# Patient Record
Sex: MALE | Race: BLACK OR AFRICAN AMERICAN | NOT HISPANIC OR LATINO | Employment: UNEMPLOYED | URBAN - METROPOLITAN AREA
[De-identification: names, ages, dates, MRNs, and addresses within clinical notes are randomized per-mention and may not be internally consistent; named-entity substitution may affect disease eponyms.]

---

## 2020-06-22 DIAGNOSIS — M25.531 RIGHT WRIST PAIN: Primary | ICD-10-CM

## 2023-08-01 ENCOUNTER — TELEPHONE (OUTPATIENT)
Dept: NEUROLOGY | Facility: CLINIC | Age: 43
End: 2023-08-01
Payer: MEDICAID

## 2023-08-01 NOTE — TELEPHONE ENCOUNTER
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  The patient has medicaid as his insurance which I am aware we do not accept but was not sure in this case if an exception is made.  I have scanned the referral and notes into media mgr.  Please review and contact the patient to schedule or advise.     Thank you,    Jennie Deluca  [facility]

## 2023-08-18 ENCOUNTER — TELEPHONE (OUTPATIENT)
Dept: NEUROLOGY | Facility: CLINIC | Age: 43
End: 2023-08-18
Payer: MEDICAID

## 2023-08-18 NOTE — TELEPHONE ENCOUNTER
Received communication from [facility] regarding the pt needing to be scheduled. I secured an appt with Dr Saenz for 08/28/23 but was unable to reach the pt or the significant other. The numbers that are in the system were either disconnected or not accepting calls. I will keep this appt on the schedule in the event we are able to reach the pt in within the next week.  Reached out to the pt's PCP, spoke with Haley. She reports she has the same contact information that is out dated, but the pt is scheduled for a visit Aug 31st with that office. She states that the pt occassionally pops in to visit with them and if he comes in before his scheduled appt, she will inform him of his appt with Dr Saenz on Aug 28th. I provided my contact information and advised the pt to ask for me if/ when he calls.

## 2023-08-28 ENCOUNTER — OFFICE VISIT (OUTPATIENT)
Dept: NEUROLOGY | Facility: CLINIC | Age: 43
End: 2023-08-28
Payer: MEDICAID

## 2023-08-28 ENCOUNTER — TELEPHONE (OUTPATIENT)
Dept: NEUROLOGY | Facility: CLINIC | Age: 43
End: 2023-08-28
Payer: MEDICAID

## 2023-08-28 VITALS
SYSTOLIC BLOOD PRESSURE: 105 MMHG | RESPIRATION RATE: 17 BRPM | DIASTOLIC BLOOD PRESSURE: 71 MMHG | HEIGHT: 70 IN | BODY MASS INDEX: 21.6 KG/M2 | HEART RATE: 72 BPM | WEIGHT: 150.88 LBS

## 2023-08-28 DIAGNOSIS — M21.372 LEFT FOOT DROP: ICD-10-CM

## 2023-08-28 DIAGNOSIS — R53.1 WEAKNESS: Primary | ICD-10-CM

## 2023-08-28 DIAGNOSIS — F41.9 ANXIETY: ICD-10-CM

## 2023-08-28 PROCEDURE — 99999 PR PBB SHADOW E&M-EST. PATIENT-LVL IV: CPT | Mod: PBBFAC,,, | Performed by: PSYCHIATRY & NEUROLOGY

## 2023-08-28 PROCEDURE — 99214 OFFICE O/P EST MOD 30 MIN: CPT | Mod: PBBFAC,PO | Performed by: PSYCHIATRY & NEUROLOGY

## 2023-08-28 PROCEDURE — 1159F MED LIST DOCD IN RCRD: CPT | Mod: CPTII,,, | Performed by: PSYCHIATRY & NEUROLOGY

## 2023-08-28 PROCEDURE — 3078F PR MOST RECENT DIASTOLIC BLOOD PRESSURE < 80 MM HG: ICD-10-PCS | Mod: CPTII,,, | Performed by: PSYCHIATRY & NEUROLOGY

## 2023-08-28 PROCEDURE — 3078F DIAST BP <80 MM HG: CPT | Mod: CPTII,,, | Performed by: PSYCHIATRY & NEUROLOGY

## 2023-08-28 PROCEDURE — 1159F PR MEDICATION LIST DOCUMENTED IN MEDICAL RECORD: ICD-10-PCS | Mod: CPTII,,, | Performed by: PSYCHIATRY & NEUROLOGY

## 2023-08-28 PROCEDURE — 99204 OFFICE O/P NEW MOD 45 MIN: CPT | Mod: S$PBB,,, | Performed by: PSYCHIATRY & NEUROLOGY

## 2023-08-28 PROCEDURE — 99204 PR OFFICE/OUTPT VISIT, NEW, LEVL IV, 45-59 MIN: ICD-10-PCS | Mod: S$PBB,,, | Performed by: PSYCHIATRY & NEUROLOGY

## 2023-08-28 PROCEDURE — 3074F PR MOST RECENT SYSTOLIC BLOOD PRESSURE < 130 MM HG: ICD-10-PCS | Mod: CPTII,,, | Performed by: PSYCHIATRY & NEUROLOGY

## 2023-08-28 PROCEDURE — 3008F PR BODY MASS INDEX (BMI) DOCUMENTED: ICD-10-PCS | Mod: CPTII,,, | Performed by: PSYCHIATRY & NEUROLOGY

## 2023-08-28 PROCEDURE — 99999 PR PBB SHADOW E&M-EST. PATIENT-LVL IV: ICD-10-PCS | Mod: PBBFAC,,, | Performed by: PSYCHIATRY & NEUROLOGY

## 2023-08-28 PROCEDURE — 1160F RVW MEDS BY RX/DR IN RCRD: CPT | Mod: CPTII,,, | Performed by: PSYCHIATRY & NEUROLOGY

## 2023-08-28 PROCEDURE — 3008F BODY MASS INDEX DOCD: CPT | Mod: CPTII,,, | Performed by: PSYCHIATRY & NEUROLOGY

## 2023-08-28 PROCEDURE — 1160F PR REVIEW ALL MEDS BY PRESCRIBER/CLIN PHARMACIST DOCUMENTED: ICD-10-PCS | Mod: CPTII,,, | Performed by: PSYCHIATRY & NEUROLOGY

## 2023-08-28 PROCEDURE — 3074F SYST BP LT 130 MM HG: CPT | Mod: CPTII,,, | Performed by: PSYCHIATRY & NEUROLOGY

## 2023-08-28 RX ORDER — ASPIRIN 81 MG/1
TABLET ORAL
COMMUNITY
Start: 2023-03-20

## 2023-08-28 RX ORDER — SERTRALINE HYDROCHLORIDE 100 MG/1
100 TABLET, FILM COATED ORAL
COMMUNITY
Start: 2023-08-03

## 2023-08-28 RX ORDER — QUETIAPINE FUMARATE 50 MG/1
50 TABLET, FILM COATED ORAL
COMMUNITY
Start: 2023-08-03

## 2023-08-28 RX ORDER — OMEPRAZOLE 40 MG/1
40 CAPSULE, DELAYED RELEASE ORAL
COMMUNITY
Start: 2023-08-03

## 2023-08-28 RX ORDER — HYDROCODONE BITARTRATE AND ACETAMINOPHEN 10; 325 MG/1; MG/1
1 TABLET ORAL 3 TIMES DAILY
COMMUNITY
Start: 2023-08-03

## 2023-08-28 RX ORDER — CYPROHEPTADINE HYDROCHLORIDE 4 MG/1
4 TABLET ORAL 4 TIMES DAILY
COMMUNITY
Start: 2023-08-03

## 2023-08-28 RX ORDER — ESCITALOPRAM OXALATE 5 MG/1
5 TABLET ORAL DAILY
Qty: 30 TABLET | Refills: 11 | Status: SHIPPED | OUTPATIENT
Start: 2023-08-28 | End: 2024-08-27

## 2023-08-28 RX ORDER — GABAPENTIN 400 MG/1
400 CAPSULE ORAL 3 TIMES DAILY
COMMUNITY
Start: 2023-07-31

## 2023-08-28 NOTE — PROGRESS NOTES
NEUROLOGY  Outpatient CONSULT  [facility]  [address] [facility], [place] [address]  [phone] (office) / [phone] (fax)    Patient Name:  Rickey Trimble Jr.  :  1980  MR #:  [id number]  Acct #:  [id number]    Date of Neurology Consult: 2023  Name of Neurologist: Lisa Saenz D.O, ABPN, AOBNP, ABEM    Other Physicians:  Dada Delarosa MD (Primary Care Physician); Self, Aaareferral (Referring)      Chief Complaint: als r/o      History of Present Illness (HPI):  Rickey Trimble Jr. is a 43 y.o. male here for evaluation of numbness and weakness.    Patient states in  he had an IV in his left arm.  He felt like they hit a nerve when they put the IV in.  But since then he started to feel numbness and tingling sensations in the left arm and leg and now the right arm and leg.  The left hand started to shrink and change in appearance around .  He began having difficulty holding cups.  He has trouble feeding himself and trouble bathing.  He has trouble walking that started around 2016 as well.  He is falling every now an then.  Maybe once a week he will lose balance.  There is no difficulty speaking or swallowing.  He will drag his foot when walking.  When he walks he will hear his foot stomp or slap.      There was no preceding illness.  He has no other family with anything like this.    There is no trouble with shortness of breath.  He has no trouble laying flat on his back.    They live in [place], [place].    He tried a medrol dose magaly but this was no help.    Treatment to date:    Medrol dose pack    Review of Systems:   General: Weight gain: Yes, Weight Loss: No, Fatigue: No,   Fever: No, Chills: No, Night Sweats: No, Insomnia: No, Excessive sleeping: No   Respiratory:  Cough: No, Shortness of Breath: No,   Wheezing: No, Excessive Snoring: No, Coughing up blood: No  Endocrine: Heat Intolerance: No, Cold Intolerance: No,   Excessive Thirst: No, Excessive Hunger: No,    Eyes:  Blurred Vision: Yes, Double Vision: No,   Light Sensitivity: Yes, Eye pain: No  Musculoskeletal: Muscle Aches/Pain: Yes, Joint Pain/Swelling: Yes, Muscle Cramps: Yes, Muscle Weakness: Yes, Neck Pain: Yes, Back Pain: Yes   Neurological: Difficulty Walking/Falls: Yes, Headache Migraine: Yes, Dizziness/Vertigo: Yes, Fainting: No, Difficulty with Speech: No, Weakness: Yes, Tingling/Numbness: Yes, Tremors: No, Memory Problems: No, Seizures: No, Difficulty Swallowing: No, Altered Taste: No.  Cardiovascular: Chest Pain: No, Shortness of Breath: No,   Palpitations: No,  Gastrointestinal: Nausea/Vomiting: No, Constipation: Yes, Diarrhea: No, Bloody Stools: No   Psych/Cog:  Depression: No, Anxiety: Yes, Hallucinations: No, Problems Concentrating: No  : Frequent Urination: No, Incontinence: No, Urinary Infections: No, Blood of Urine: No,   ENT:Hearing Loss: No, Earache: No, Ringing in Ears: No,   Facial Pain: Yes, Chronic Congestion: No   Immune: Seasonal Allergies: Yes, Hives and/or Rashes: Yes  The remainder of the review of twelve body systems was reviewed and normal.    Past Medical, Surgical, Family & Social History:   Past Medical History:   Diagnosis Date    Stroke 2020     History reviewed. No pertinent surgical history.  No family history on file.  Alcohol use:  reports no history of alcohol use.   (Of note, 0.6 oz = 1 beer or 6 oz = 10 beers).  Tobacco use:  reports that he has never smoked. He has never used smokeless tobacco.  Street drug use:  has no history on file for drug use.  Allergies: Patient has no known allergies..    Home Medications:     Current Outpatient Medications:     cyproheptadine (PERIACTIN) 4 mg tablet, Take 4 mg by mouth 4 (four) times daily., Disp: , Rfl:     gabapentin (NEURONTIN) 400 MG capsule, Take 400 mg by mouth 3 (three) times daily., Disp: , Rfl:     omeprazole (PRILOSEC) 40 MG capsule, Take 40 mg by mouth., Disp: , Rfl:     QUEtiapine (SEROQUEL) 50 MG tablet, Take 50 mg by  "mouth., Disp: , Rfl:     sertraline (ZOLOFT) 100 MG tablet, Take 100 mg by mouth., Disp: , Rfl:     aspirin (ECOTRIN) 81 MG EC tablet, 1 tablet Orally Once a day for 30 day(s), Disp: , Rfl:     HYDROcodone-acetaminophen (NORCO)  mg per tablet, Take 1 tablet by mouth 3 (three) times daily., Disp: , Rfl:     Physical Examination:  /71 (BP Location: Right arm, Patient Position: Sitting, BP Method: Small (Automatic))   Pulse 72   Resp 17   Ht 5' 10" (1.778 m)   Wt 68.5 kg (150 lb 14.5 oz)   BMI 21.65 kg/m²     GENERAL:  General appearance: Well, non-toxic appearing.  No apparent distress.  Extremities: normal.    MENTAL STATUS:  Alertness, attention span & concentration: normal.  Language: normal.  Orientation to self, place & time:  normal.  Memory, recent & remote: normal.  Fund of knowledge: normal.    SPEECH:  Clear and fluent.  Follows complex commands.    CRANIAL NERVES:  Cranial Nerves II-XII were examined.  II - Visual fields: normal.  III, IV, VI: PERRL, EOMI, No ptosis, No nystagmus.  V - Facial sensation: normal.  VII - Face symmetry & mobility: normal.  VIII - Hearing: normal.  IX, X - Palate: mobile & midline.  XI - Shoulder shrug: normal.  XII - Tongue protrusion: normal.    GROSS MOTOR:  Gait & station: normal.  Tone: normal.  Abnormal movements: none.  Finger-nose & Heel-knee-shin: normal.  Rapid alternating movements & drift: normal.      MUSCLE STRENGTH:     Fascics Atrophy RIGHT    LEFT Atrophy Fascics     5 Neck Ext. 5       5 Neck Flex 5       5 Deltoids 4       5 Sh.Ext.Rot. 4+       5 Sh.Int.Rot. 5       5 Biceps 3       5 Triceps 4       4 Forearm.Pr. 4       4 Wrist Ext. 2       4 Wrist Flex 2       3 Finger Ext. 2       5 Finger Flex 4       5 FPL 4       2 Inteross. 2                         4 Iliopsoas 4       5 Hip Abduct 5       5 Hip Adduct 5       4+ Quads 4+       4 Hams 4       4 Dorsiflex 3       4 Plantar Flex 4       4 Ankle Jhony 3       4 Ankle Invert 3       4 Toe " Ext. 3       4 Toe Flex 4                         REFLEXES:    RIGHT Reflex   LEFT   2+ Biceps 2+   2+ Brachiorad. 2+   2+ Triceps 2+        2+ Patellar 2+   2+ Ankle 2+         Down PLANTAR Down     SENSORY:  Light touch: Normal throughout.  Sharp touch: Normal throughout.  Vibration: Normal throughout.      Diagnostic Data Reviewed:     Assessment and Plan:  Rickey Trimble Jr. Is a 43 y.o. man with complaints of progressive weakness over the past 8 years.  He has some trouble with anxieties and foot dorp.  He can have some numbness with this.  Etiology is unclear at his time, but he has been informed this could be related to ALS.    Information on patient AVS:  Will order PT and OT for your weakness and muscle contractures.    Will get a brace for your left ankle to help avoid falls.    Will start Lexapro 5mg.  Will likely transition from sertraline to this for anxiety.    Will get your previous records including EMGs and MRI.    Will get an updated EMG.      Important to note, also  has a past medical history of Stroke (2020).    Time Spent: I spent a total of 55 minutes on the day of the visit.This includes face to face time and non-face to face time preparing to see the patient (eg, review of tests), Obtaining and/or reviewing separately obtained history, Documenting clinical information in the electronic or other health record, Independently interpreting resultsand communicating results to the patient/family/caregiver, or Care coordination.         Lisa Saenz D.O, ABPN, AOBNP, ABEM    This note was created with voice recognition software.  Grammatical, syntax and spelling errors may be inevitable.

## 2023-08-28 NOTE — PATIENT INSTRUCTIONS
Will order PT and OT for your weakness and muscle contractures.    Will get a brace for your left ankle to help avoid falls.    Will start Lexapro 5mg.  Will likely transition from sertraline to this for anxiety.    Will get your previous records including EMGs and MRI.    Will get an updated EMG.

## 2023-10-02 ENCOUNTER — TELEPHONE (OUTPATIENT)
Dept: NEUROLOGY | Facility: CLINIC | Age: 43
End: 2023-10-02
Payer: MEDICAID

## 2023-10-02 NOTE — TELEPHONE ENCOUNTER
Spoke with pt, offered sooner appt for EMG, pt can only do Mondays. I advised I will call back when I have a Monday cancellation. Pt v/u

## 2023-10-02 NOTE — TELEPHONE ENCOUNTER
----- Message from Yen Núñez sent at 10/2/2023  9:27 AM CDT -----  Type:  Patient Returning Call    Who Called:  pt  Who Left Message for Patient:  Rachna  Does the patient know what this is regarding?:  yes, reschedule EMG  Best Call Back Number:  [phone]  Additional Information:  Please call back to advise Thanks!

## 2024-07-09 ENCOUNTER — TELEPHONE (OUTPATIENT)
Dept: NEUROLOGY | Facility: CLINIC | Age: 44
End: 2024-07-09
Payer: MEDICAID

## 2024-07-09 NOTE — TELEPHONE ENCOUNTER
Left message for the pt to call in regards to a message we received from the pharmacy stating that both Escitlopram 5 mg Sertraline 100 mg were dispensed on 6/4/24.  Unable to leave a VM on the pts phone.  Message left on significant others phone.